# Patient Record
Sex: FEMALE | Race: WHITE | NOT HISPANIC OR LATINO | Employment: FULL TIME | URBAN - METROPOLITAN AREA
[De-identification: names, ages, dates, MRNs, and addresses within clinical notes are randomized per-mention and may not be internally consistent; named-entity substitution may affect disease eponyms.]

---

## 2024-08-13 ENCOUNTER — OFFICE VISIT (OUTPATIENT)
Dept: PODIATRY | Facility: CLINIC | Age: 54
End: 2024-08-13
Payer: COMMERCIAL

## 2024-08-13 VITALS
WEIGHT: 204.81 LBS | DIASTOLIC BLOOD PRESSURE: 75 MMHG | HEART RATE: 84 BPM | BODY MASS INDEX: 32.92 KG/M2 | SYSTOLIC BLOOD PRESSURE: 123 MMHG | HEIGHT: 66 IN

## 2024-08-13 DIAGNOSIS — L97.511 ULCER OF BOTH FEET, LIMITED TO BREAKDOWN OF SKIN: ICD-10-CM

## 2024-08-13 DIAGNOSIS — M20.11 VALGUS DEFORMITY OF BOTH GREAT TOES: ICD-10-CM

## 2024-08-13 DIAGNOSIS — L97.521 ULCER OF BOTH FEET, LIMITED TO BREAKDOWN OF SKIN: ICD-10-CM

## 2024-08-13 DIAGNOSIS — G60.9 IDIOPATHIC PERIPHERAL NEUROPATHY: Primary | ICD-10-CM

## 2024-08-13 DIAGNOSIS — M20.12 VALGUS DEFORMITY OF BOTH GREAT TOES: ICD-10-CM

## 2024-08-13 PROCEDURE — 99999 PR PBB SHADOW E&M-NEW PATIENT-LVL IV: CPT | Mod: PBBFAC,,, | Performed by: PODIATRIST

## 2024-08-13 PROCEDURE — 3008F BODY MASS INDEX DOCD: CPT | Mod: S$GLB,,, | Performed by: PODIATRIST

## 2024-08-13 PROCEDURE — 1159F MED LIST DOCD IN RCRD: CPT | Mod: S$GLB,,, | Performed by: PODIATRIST

## 2024-08-13 PROCEDURE — 3078F DIAST BP <80 MM HG: CPT | Mod: S$GLB,,, | Performed by: PODIATRIST

## 2024-08-13 PROCEDURE — 1160F RVW MEDS BY RX/DR IN RCRD: CPT | Mod: S$GLB,,, | Performed by: PODIATRIST

## 2024-08-13 PROCEDURE — 99203 OFFICE O/P NEW LOW 30 MIN: CPT | Mod: S$GLB,,, | Performed by: PODIATRIST

## 2024-08-13 PROCEDURE — 3074F SYST BP LT 130 MM HG: CPT | Mod: S$GLB,,, | Performed by: PODIATRIST

## 2024-08-13 RX ORDER — MULTIVITAMIN
1 TABLET ORAL DAILY
COMMUNITY

## 2024-08-13 RX ORDER — ERGOCALCIFEROL 1.25 MG/1
50000 CAPSULE ORAL
COMMUNITY

## 2024-08-13 RX ORDER — CITALOPRAM 40 MG/1
40 TABLET, FILM COATED ORAL DAILY
COMMUNITY

## 2024-08-13 RX ORDER — BUSPIRONE HYDROCHLORIDE 10 MG/1
10 TABLET ORAL 2 TIMES DAILY
COMMUNITY

## 2024-08-13 RX ORDER — VALACYCLOVIR HYDROCHLORIDE 500 MG/1
500 TABLET, FILM COATED ORAL 2 TIMES DAILY
COMMUNITY

## 2024-08-13 RX ORDER — ASPIRIN 81 MG/1
81 TABLET ORAL DAILY
COMMUNITY

## 2024-08-17 PROBLEM — L97.511 ULCER OF BOTH FEET, LIMITED TO BREAKDOWN OF SKIN: Status: ACTIVE | Noted: 2024-08-17

## 2024-08-17 PROBLEM — M20.12 VALGUS DEFORMITY OF BOTH GREAT TOES: Status: ACTIVE | Noted: 2024-08-17

## 2024-08-17 PROBLEM — G60.9 IDIOPATHIC PERIPHERAL NEUROPATHY: Status: ACTIVE | Noted: 2024-08-17

## 2024-08-17 PROBLEM — M20.11 VALGUS DEFORMITY OF BOTH GREAT TOES: Status: ACTIVE | Noted: 2024-08-17

## 2024-08-17 PROBLEM — L97.521 ULCER OF BOTH FEET, LIMITED TO BREAKDOWN OF SKIN: Status: ACTIVE | Noted: 2024-08-17

## 2024-08-17 NOTE — PROGRESS NOTES
Spiritual Health Assessment/Progress Note  [facility]    Spiritual/Emotional Needs,  , Adjustment to illness,      Name: Greg Easton MRN: [id number]    Age: 86 y.o.     Sex: male   Language: English   Confucianist: Yarsanism   Shoulder pain     Date: 8/2/2024            Total Time Calculated: 22 min              Spiritual Assessment began in [facility]        Referral/Consult From: Patient, Nurse   Encounter Overview/Reason: Spiritual/Emotional Needs  Service Provided For: Patient and family together    Beatris, Belief, Meaning:   Patient identifies as spiritual, is connected with a beatris tradition or spiritual practice, and has beliefs or practices that help with coping during difficult times  Family/Friends identify as spiritual, are connected with a beatris tradition or spiritual practice, and have beliefs or practices that help with coping during difficult times      Importance and Influence:  Patient has spiritual/personal beliefs that influence decisions regarding their health  Family/Friends have spiritual/personal beliefs that influence decisions regarding the patient's health    Community:  Patient is connected with a spiritual community and feels well-supported. Support system includes: Children  Family/Friends are connected with a spiritual community:    Assessment and Plan of Care:     Patient Interventions include: Facilitated expression of thoughts and feelings, Explored spiritual coping/struggle/distress and theological reflection, and Affirmed coping skills/support systems  Family/Friends Interventions include: Affirmed coping skills/support systems    Patient Plan of Care: Spiritual Care available upon further referral  Family/Friends Plan of Care: Spiritual Care available upon further referral    Electronically signed by ROCHELLE Villeda on 8/2/2024 at 3:03 PM     Subjective:       Patient ID: Tammy Ballard is a 54 y.o. female.    Chief Complaint: Nail Problem and Foot Pain    Patient presents for a new patient evaluation she relates that a few months ago she had pus coming out of the bottom of her nail on her left great toe she states she starting to have some problems with the nail on the right great toe and she states her feel feet feel as if she is walking on rocks.  Patient does have a history of neuropathy secondary to cancer treatment she reports tingling in both feet.  Patient has bunions on both feet she states her feet have begun to bother her for the past 6-9 months.  Past Medical History:   Diagnosis Date    Anxiety     Depression     Hyperlipidemia     Hypertension      Past Surgical History:   Procedure Laterality Date     SECTION      gastric sleeve      HEMICOLECTOMY, RIGHT, LAPAROSCOPIC      HYSTERECTOMY       Family History   Problem Relation Name Age of Onset    Stroke Maternal Grandmother      Diabetes Maternal Grandmother      Cancer Maternal Grandfather       Social History     Socioeconomic History    Marital status:    Tobacco Use    Smoking status: Former     Current packs/day: 0.00     Types: Cigarettes     Quit date:      Years since quittin.6       Current Outpatient Medications   Medication Sig Dispense Refill    aspirin (ECOTRIN) 81 MG EC tablet Take 81 mg by mouth once daily.      busPIRone (BUSPAR) 10 MG tablet Take 10 mg by mouth 2 (two) times daily.      citalopram (CELEXA) 40 MG tablet Take 40 mg by mouth once daily.      ergocalciferol (VITAMIN D2) 50,000 unit Cap Take 50,000 Units by mouth every 7 days.      METOPROLOL SUCCINATE ORAL Take by mouth.      multivitamin (ONE DAILY MULTIVITAMIN) per tablet Take 1 tablet by mouth once daily.      POTASSIUM ACETATE MISC by Misc.(Non-Drug; Combo Route) route.      rosuvastatin calcium (CRESTOR ORAL) Take by mouth.      semaglutide (WEGOVY SUBQ) Inject into the skin.       "valACYclovir (VALTREX) 500 MG tablet Take 500 mg by mouth 2 (two) times daily.       No current facility-administered medications for this visit.     Review of patient's allergies indicates:  No Known Allergies    Review of Systems   Musculoskeletal:  Positive for arthralgias.   Skin:  Positive for color change.   Neurological:  Positive for numbness.   All other systems reviewed and are negative.      Objective:      Vitals:    08/13/24 1439   BP: 123/75   Pulse: 84   Weight: 92.9 kg (204 lb 12.8 oz)   Height: 5' 6" (1.676 m)     Physical Exam  Vitals and nursing note reviewed.   Constitutional:       Appearance: Normal appearance.   Cardiovascular:      Pulses:           Dorsalis pedis pulses are 1+ on the right side and 1+ on the left side.        Posterior tibial pulses are 1+ on the right side and 1+ on the left side.   Pulmonary:      Effort: Pulmonary effort is normal.   Musculoskeletal:         General: Deformity present.      Right foot: Deformity, bunion and prominent metatarsal heads present.      Left foot: Deformity, bunion and prominent metatarsal heads present.        Feet:    Feet:      Right foot:      Protective Sensation: 4 sites tested.  2 sites sensed.      Skin integrity: Ulcer, callus and dry skin present.      Toenail Condition: Fungal disease present.     Left foot:      Protective Sensation: 4 sites tested.  2 sites sensed.      Skin integrity: Ulcer, callus and dry skin present.      Toenail Condition: Fungal disease present.  Skin:     Capillary Refill: Capillary refill takes 2 to 3 seconds.      Findings: Lesion present.   Neurological:      Mental Status: She is alert.      Sensory: Sensory deficit present.   Psychiatric:         Mood and Affect: Mood normal.         Behavior: Behavior normal.                            Assessment:       1. Idiopathic peripheral neuropathy    2. Ulcer of both feet, limited to breakdown of skin    3. Valgus deformity of both great toes        Plan:     "   Patient presents for a new patient evaluation she relates that a few months ago she had pus coming out of the bottom of her nail on her left great toe she states she starting to have some problems with the nail on the right great toe and she states her feel feet feel as if she is walking on rocks.  Patient does have a history of neuropathy secondary to cancer treatment she reports tingling in both feet.  Patient has bunions on both feet she states her feet have begun to bother her for the past 6-9 months.  A comprehensive new patient evaluation was performed patient has signs of neuropathy in both feet primarily in the forefoot bilateral this is likely related to her previous cancer treatment which is very common.  Patient has excessively dry skin she has pre ulcerative areas with hyperkeratotic tissue on the plantar forefoot underlying the 2nd metatarsal right greater than left I advised the patient when she is walking on these hard dry callused areas a causes discomfort certainly contributes to her discomfort this can certainly be related to her neuropathy but also related to the bunion deformities I explained to the patient how the bunion causes a shift of weight transfer to the lesser metatarsals because the 1st metatarsal is not properly bearing weight upon ambulation.  I have recommended the use of a 40% urea cream to keep the areas smooth down keep it hydrated this will help prevent the buildup of callus tissue and likely address some of the discomfort that she is having.  I did evaluate the patient's toes she does have fungus in her toenails I advised while she has obvious signs of fungal infection in the nail there was no bacterial infection even though she had noticed some pus a few months ago this was primarily from the proximal nail fold which is unusual typically the distal nail fold will separate 1st from the nail bed but the patient does have some separation at the proximal nail fold I have  recommended the application of Vicks vapor rub advising the patient this needs to be applied twice a day every day to help address the fungal infection in the nails and it can take 4-6 months of application twice a day before the patient can expect to see positive results.  Patient was in understanding and agreement with everything discussed information was provided for 40% urea cream and wear can be purchased plan follow-up as needed if the patient is not doing better if these things are not improving she is to contact us for follow-up.  Bunion surgery was not recommended nor was it discussed at this time.This note was created using M*Chance (app) voice recognition software that occasionally misinterpreted phrases or words.

## 2025-03-24 ENCOUNTER — OFFICE VISIT (OUTPATIENT)
Dept: URGENT CARE | Facility: CLINIC | Age: 55
End: 2025-03-24
Payer: COMMERCIAL

## 2025-03-24 VITALS
DIASTOLIC BLOOD PRESSURE: 77 MMHG | HEIGHT: 67 IN | TEMPERATURE: 98 F | SYSTOLIC BLOOD PRESSURE: 150 MMHG | BODY MASS INDEX: 29.03 KG/M2 | RESPIRATION RATE: 17 BRPM | WEIGHT: 185 LBS | HEART RATE: 75 BPM | OXYGEN SATURATION: 99 %

## 2025-03-24 DIAGNOSIS — N39.0 URINARY TRACT INFECTION WITHOUT HEMATURIA, SITE UNSPECIFIED: Primary | ICD-10-CM

## 2025-03-24 DIAGNOSIS — R30.0 DYSURIA: ICD-10-CM

## 2025-03-24 DIAGNOSIS — R30.0 BURNING WITH URINATION: ICD-10-CM

## 2025-03-24 LAB
BILIRUBIN, UA POC OHS: NEGATIVE
BLOOD, UA POC OHS: ABNORMAL
CLARITY, UA POC OHS: ABNORMAL
COLOR, UA POC OHS: ABNORMAL
GLUCOSE, UA POC OHS: NEGATIVE
KETONES, UA POC OHS: NEGATIVE
LEUKOCYTES, UA POC OHS: ABNORMAL
NITRITE, UA POC OHS: NEGATIVE
PH, UA POC OHS: 7
PROTEIN, UA POC OHS: 30
SPECIFIC GRAVITY, UA POC OHS: 1.02
UROBILINOGEN, UA POC OHS: 0.2

## 2025-03-24 PROCEDURE — 99214 OFFICE O/P EST MOD 30 MIN: CPT | Mod: S$GLB,,, | Performed by: NURSE PRACTITIONER

## 2025-03-24 PROCEDURE — 81003 URINALYSIS AUTO W/O SCOPE: CPT | Mod: QW,S$GLB,, | Performed by: NURSE PRACTITIONER

## 2025-03-24 RX ORDER — METOPROLOL TARTRATE 25 MG/1
25 TABLET, FILM COATED ORAL 2 TIMES DAILY
COMMUNITY

## 2025-03-24 RX ORDER — MULTIVITAMIN
1 TABLET ORAL EVERY MORNING
COMMUNITY

## 2025-03-24 RX ORDER — PHENAZOPYRIDINE HYDROCHLORIDE 100 MG/1
100 TABLET, FILM COATED ORAL EVERY 8 HOURS PRN
Qty: 6 TABLET | Refills: 0 | Status: SHIPPED | OUTPATIENT
Start: 2025-03-24 | End: 2025-03-26

## 2025-03-24 RX ORDER — IXEKIZUMAB 80 MG/ML
INJECTION, SOLUTION SUBCUTANEOUS
COMMUNITY
Start: 2025-03-18

## 2025-03-24 RX ORDER — CETIRIZINE HYDROCHLORIDE 10 MG/1
10 TABLET, FILM COATED ORAL
COMMUNITY

## 2025-03-24 RX ORDER — FLUTICASONE PROPIONATE 50 MCG
1 SPRAY, SUSPENSION (ML) NASAL
COMMUNITY
Start: 2025-02-28

## 2025-03-24 RX ORDER — ALPRAZOLAM 0.5 MG/1
0.5 TABLET ORAL
COMMUNITY
Start: 2024-03-13

## 2025-03-24 RX ORDER — CYCLOSPORINE 0.5 MG/ML
1 EMULSION OPHTHALMIC 2 TIMES DAILY
COMMUNITY
Start: 2025-02-28

## 2025-03-24 RX ORDER — NITROFURANTOIN 25; 75 MG/1; MG/1
100 CAPSULE ORAL 2 TIMES DAILY
Qty: 10 CAPSULE | Refills: 0 | Status: SHIPPED | OUTPATIENT
Start: 2025-03-24 | End: 2025-03-29

## 2025-03-24 RX ORDER — ESTRADIOL 0.1 MG/G
CREAM VAGINAL
COMMUNITY
Start: 2025-02-28

## 2025-03-24 NOTE — PROGRESS NOTES
"Subjective:      Patient ID: Tammy Ballard is a 54 y.o. female.    Vitals:  height is 5' 7" (1.702 m) and weight is 83.9 kg (185 lb). Her oral temperature is 98.2 °F (36.8 °C). Her blood pressure is 150/77 (abnormal) and her pulse is 75. Her respiration is 17 and oxygen saturation is 99%.     Chief Complaint: Dysuria (Symptoms started on the followin day ago. Symptoms are the following:burning urination, frequency, urgency, slight blood when wipe, odor urine, denies discharge or fever . Symptoms not treated )    54-year-old afebrile female who presents today with chief complaint of dysuria, urinary frequency, and urinary urgency since yesterday.  She denies any fever, chills, nausea, vomiting, abdominal or flank pain.      Dysuria   This is a new problem. The current episode started acute onset. The problem occurs every urination. The problem has been gradually worsening. The quality of the pain is described as aching and burning. The pain is at a severity of 2/10. The pain is mild. There has been no fever. Associated symptoms include frequency, hematuria and urgency. Associated symptoms comments: Odor to urine. She has tried nothing for the symptoms. The treatment provided no relief. Her past medical history is significant for hypertension and recurrent UTIs.       Genitourinary:  Positive for dysuria, frequency, urgency and hematuria.      Objective:     Physical Exam   Constitutional: She is oriented to person, place, and time.  Non-toxic appearance. She does not appear ill. No distress. obesity  HENT:   Head: Normocephalic and atraumatic.   Nose: Nose normal.   Mouth/Throat: Mucous membranes are moist. No posterior oropharyngeal erythema. Oropharynx is clear.   Eyes: Conjunctivae are normal. Extraocular movement intact   Neck: Neck supple. No neck rigidity present.   Cardiovascular: Normal rate, regular rhythm, normal heart sounds and normal pulses.   Pulmonary/Chest: Effort normal and breath sounds " normal.   Abdominal: Normal appearance and bowel sounds are normal. She exhibits no distension and no mass. Soft. flat abdomen There is no abdominal tenderness. There is no left CVA tenderness and no right CVA tenderness.   Musculoskeletal: Normal range of motion.         General: Normal range of motion.      Cervical back: She exhibits no tenderness.   Lymphadenopathy:     She has no cervical adenopathy.   Neurological: She is alert and oriented to person, place, and time.   Skin: Skin is warm, dry and not diaphoretic.   Psychiatric: Her behavior is normal.   Vitals reviewed.      Assessment:     1. Urinary tract infection without hematuria, site unspecified    2. Dysuria    3. Burning with urination        Plan:       Urinary tract infection without hematuria, site unspecified  -     nitrofurantoin, macrocrystal-monohydrate, (MACROBID) 100 MG capsule; Take 1 capsule (100 mg total) by mouth 2 (two) times daily. for 5 days  Dispense: 10 capsule; Refill: 0  -     phenazopyridine (PYRIDIUM) 100 MG tablet; Take 1 tablet (100 mg total) by mouth every 8 (eight) hours as needed for Pain.  Dispense: 6 tablet; Refill: 0    Dysuria  -     POCT Urinalysis(Instrument)    Burning with urination      INSTRUCTIONS  Medications as prescribed.  Rest.  Increase oral fluids.  Follow up with your doctor as advised.  In the meantime, return here or go to ER for worsening of symptoms, or for any new symptoms as discussed.

## 2025-07-14 ENCOUNTER — OFFICE VISIT (OUTPATIENT)
Dept: URGENT CARE | Facility: CLINIC | Age: 55
End: 2025-07-14
Payer: COMMERCIAL

## 2025-07-14 VITALS
RESPIRATION RATE: 19 BRPM | DIASTOLIC BLOOD PRESSURE: 74 MMHG | HEIGHT: 67 IN | WEIGHT: 195 LBS | OXYGEN SATURATION: 99 % | BODY MASS INDEX: 30.61 KG/M2 | SYSTOLIC BLOOD PRESSURE: 112 MMHG | HEART RATE: 80 BPM | TEMPERATURE: 98 F

## 2025-07-14 DIAGNOSIS — N39.0 URINARY TRACT INFECTION WITHOUT HEMATURIA, SITE UNSPECIFIED: ICD-10-CM

## 2025-07-14 DIAGNOSIS — R30.0 DYSURIA: Primary | ICD-10-CM

## 2025-07-14 LAB
BILIRUBIN, UA POC OHS: NEGATIVE
BLOOD, UA POC OHS: ABNORMAL
CLARITY, UA POC OHS: ABNORMAL
COLOR, UA POC OHS: YELLOW
GLUCOSE, UA POC OHS: NEGATIVE
KETONES, UA POC OHS: NEGATIVE
LEUKOCYTES, UA POC OHS: ABNORMAL
NITRITE, UA POC OHS: NEGATIVE
PH, UA POC OHS: 6.5
PROTEIN, UA POC OHS: 30
SPECIFIC GRAVITY, UA POC OHS: >=1.03
UROBILINOGEN, UA POC OHS: 0.2

## 2025-07-14 PROCEDURE — 81003 URINALYSIS AUTO W/O SCOPE: CPT | Mod: QW,S$GLB,, | Performed by: NURSE PRACTITIONER

## 2025-07-14 PROCEDURE — 99214 OFFICE O/P EST MOD 30 MIN: CPT | Mod: S$GLB,,, | Performed by: NURSE PRACTITIONER

## 2025-07-14 RX ORDER — NITROFURANTOIN 25; 75 MG/1; MG/1
100 CAPSULE ORAL 2 TIMES DAILY
Qty: 10 CAPSULE | Refills: 0 | Status: SHIPPED | OUTPATIENT
Start: 2025-07-14 | End: 2025-07-19

## 2025-07-14 RX ORDER — METOPROLOL SUCCINATE 50 MG/1
50 TABLET, EXTENDED RELEASE ORAL
COMMUNITY
Start: 2025-05-01

## 2025-07-14 RX ORDER — ROSUVASTATIN CALCIUM 20 MG/1
20 TABLET, COATED ORAL NIGHTLY
COMMUNITY
Start: 2025-04-29

## 2025-07-14 RX ORDER — TIRZEPATIDE 5 MG/.5ML
INJECTION, SOLUTION SUBCUTANEOUS
COMMUNITY
Start: 2025-06-10

## 2025-07-14 RX ORDER — PHENAZOPYRIDINE HYDROCHLORIDE 100 MG/1
100 TABLET, FILM COATED ORAL EVERY 8 HOURS PRN
Qty: 6 TABLET | Refills: 0 | Status: SHIPPED | OUTPATIENT
Start: 2025-07-14 | End: 2025-07-16

## 2025-07-14 RX ORDER — VALACYCLOVIR HYDROCHLORIDE 1 G/1
TABLET, FILM COATED ORAL
COMMUNITY
Start: 2025-05-01

## 2025-07-14 RX ORDER — OMEPRAZOLE 40 MG/1
40 CAPSULE, DELAYED RELEASE ORAL
COMMUNITY
Start: 2025-05-01

## 2025-07-14 NOTE — PROGRESS NOTES
"Subjective:      Patient ID: Tammy Ballard is a 55 y.o. female.    Vitals:  height is 5' 7" (1.702 m) and weight is 88.5 kg (195 lb). Her oral temperature is 98.2 °F (36.8 °C). Her blood pressure is 112/74 and her pulse is 80. Her respiration is 19 and oxygen saturation is 99%.     Chief Complaint: Dysuria    55 y.o. afebrile female who presents today with a chief complaint of dysuria and pelvic pressure since noon today. Patient reports recurrent UTIs after sexual intercourse. Home treatments include Tylenol.     Dysuria   This is a recurrent problem. The current episode started today. The problem occurs every urination. The problem has been gradually worsening. The quality of the pain is described as burning. The pain is at a severity of 8/10. The pain is severe. There has been no fever. She is Sexually active. There is No history of pyelonephritis. She has tried acetaminophen for the symptoms. The treatment provided mild relief. Her past medical history is significant for hypertension and recurrent UTIs.       Genitourinary:  Positive for dysuria.   Skin:  Negative for erythema.      Objective:     Physical Exam   Constitutional: She is oriented to person, place, and time.  Non-toxic appearance. She does not appear ill. No distress. normal  HENT:   Head: Normocephalic and atraumatic.   Mouth/Throat: Mucous membranes are moist. Oropharynx is clear.   Eyes: Conjunctivae are normal. Extraocular movement intact   Neck: Neck supple. No neck rigidity present.   Cardiovascular: Normal rate, regular rhythm, normal heart sounds and normal pulses.   Pulmonary/Chest: Effort normal and breath sounds normal.   Abdominal: Normal appearance and bowel sounds are normal. She exhibits no distension and no mass. Soft. flat abdomen There is no abdominal tenderness. There is no left CVA tenderness and no right CVA tenderness.   Musculoskeletal: Normal range of motion.         General: Normal range of motion.      Cervical back: She " exhibits no tenderness.   Lymphadenopathy:     She has no cervical adenopathy.   Neurological: She is alert and oriented to person, place, and time.   Skin: Skin is warm, dry, not diaphoretic, not pale and no rash. No bruising, No erythema and No lesion no jaundice  Psychiatric: Her behavior is normal.   Vitals reviewed.    Results for orders placed or performed in visit on 07/14/25   POCT Urinalysis(Instrument)    Collection Time: 07/14/25  4:18 PM   Result Value Ref Range    Color, POC UA Yellow Yellow, Straw, Colorless    Clarity, POC UA Slight Cloudy (A) Clear    Glucose, POC UA Negative Negative    Bilirubin, POC UA Negative Negative    Ketones, POC UA Negative Negative    Spec Grav POC UA >=1.030 1.005 - 1.030    Blood, POC UA Large (A) Negative    pH, POC UA 6.5 5.0 - 8.0    Protein, POC UA 30 (A) Negative    Urobilinogen, POC UA 0.2 <=1.0    Nitrite, POC UA Negative Negative    WBC, POC UA Small (A) Negative    No results found.     Assessment:     1. Dysuria    2. Urinary tract infection without hematuria, site unspecified        Plan:       Dysuria  -     POCT Urinalysis(Instrument)    Urinary tract infection without hematuria, site unspecified  -     nitrofurantoin, macrocrystal-monohydrate, (MACROBID) 100 MG capsule; Take 1 capsule (100 mg total) by mouth 2 (two) times daily. for 5 days  Dispense: 10 capsule; Refill: 0  -     phenazopyridine (PYRIDIUM) 100 MG tablet; Take 1 tablet (100 mg total) by mouth every 8 (eight) hours as needed for Pain.  Dispense: 6 tablet; Refill: 0      INSTRUCTIONS  Medication as prescribed.  Rest.  Increase oral fluids.  Follow up with primary care as advised.  In the meantime, return here or go to ER for worsening of symptoms, or for any new symptoms as discussed.

## 2025-07-24 ENCOUNTER — RESULTS FOLLOW-UP (OUTPATIENT)
Dept: OBSTETRICS AND GYNECOLOGY | Facility: CLINIC | Age: 55
End: 2025-07-24
Payer: COMMERCIAL

## 2025-07-24 ENCOUNTER — LAB VISIT (OUTPATIENT)
Dept: LAB | Facility: HOSPITAL | Age: 55
End: 2025-07-24
Payer: COMMERCIAL

## 2025-07-24 ENCOUNTER — OFFICE VISIT (OUTPATIENT)
Dept: OBSTETRICS AND GYNECOLOGY | Facility: CLINIC | Age: 55
End: 2025-07-24
Payer: COMMERCIAL

## 2025-07-24 DIAGNOSIS — F32.A DEPRESSION, UNSPECIFIED DEPRESSION TYPE: ICD-10-CM

## 2025-07-24 DIAGNOSIS — N95.9 MENOPAUSAL AND PERIMENOPAUSAL DISORDER: Primary | ICD-10-CM

## 2025-07-24 DIAGNOSIS — R30.0 DYSURIA: ICD-10-CM

## 2025-07-24 DIAGNOSIS — N39.0 RECURRENT UTI (URINARY TRACT INFECTION): ICD-10-CM

## 2025-07-24 DIAGNOSIS — R39.15 URGENCY OF URINATION: ICD-10-CM

## 2025-07-24 DIAGNOSIS — F41.9 ANXIETY: ICD-10-CM

## 2025-07-24 DIAGNOSIS — N76.0 ACUTE VAGINITIS: ICD-10-CM

## 2025-07-24 DIAGNOSIS — R53.83 FATIGUE, UNSPECIFIED TYPE: ICD-10-CM

## 2025-07-24 DIAGNOSIS — N95.9 MENOPAUSAL AND PERIMENOPAUSAL DISORDER: ICD-10-CM

## 2025-07-24 LAB
BILIRUB UR QL STRIP.AUTO: NEGATIVE
CLARITY UR: CLEAR
COLOR UR AUTO: YELLOW
ESTRADIOL SERPL HS-MCNC: 13 PG/ML
FSH SERPL-ACNC: 79.24 MIU/ML
GLUCOSE UR QL STRIP: NEGATIVE
HGB UR QL STRIP: ABNORMAL
KETONES UR QL STRIP: NEGATIVE
LEUKOCYTE ESTERASE UR QL STRIP: NEGATIVE
NITRITE UR QL STRIP: NEGATIVE
PH UR STRIP: 7 [PH]
PROT UR QL STRIP: NEGATIVE
SP GR UR STRIP: 1.01
TESTOST SERPL-MCNC: 18 NG/DL (ref 5–73)
TSH SERPL-ACNC: 0.53 UIU/ML (ref 0.4–4)
UROBILINOGEN UR STRIP-ACNC: NEGATIVE EU/DL

## 2025-07-24 PROCEDURE — 81515 NFCT DS BV&VAGINITIS DNA ALG: CPT

## 2025-07-24 PROCEDURE — 83001 ASSAY OF GONADOTROPIN (FSH): CPT

## 2025-07-24 PROCEDURE — 3078F DIAST BP <80 MM HG: CPT | Mod: S$GLB,,,

## 2025-07-24 PROCEDURE — 3074F SYST BP LT 130 MM HG: CPT | Mod: S$GLB,,,

## 2025-07-24 PROCEDURE — 36415 COLL VENOUS BLD VENIPUNCTURE: CPT

## 2025-07-24 PROCEDURE — 84403 ASSAY OF TOTAL TESTOSTERONE: CPT

## 2025-07-24 PROCEDURE — 82670 ASSAY OF TOTAL ESTRADIOL: CPT

## 2025-07-24 PROCEDURE — 87086 URINE CULTURE/COLONY COUNT: CPT

## 2025-07-24 PROCEDURE — 84443 ASSAY THYROID STIM HORMONE: CPT

## 2025-07-24 PROCEDURE — 1160F RVW MEDS BY RX/DR IN RCRD: CPT | Mod: S$GLB,,,

## 2025-07-24 PROCEDURE — 81003 URINALYSIS AUTO W/O SCOPE: CPT

## 2025-07-24 PROCEDURE — 99204 OFFICE O/P NEW MOD 45 MIN: CPT | Mod: S$GLB,,,

## 2025-07-24 PROCEDURE — 99999 PR PBB SHADOW E&M-EST. PATIENT-LVL IV: CPT | Mod: PBBFAC,,,

## 2025-07-24 PROCEDURE — 3008F BODY MASS INDEX DOCD: CPT | Mod: S$GLB,,,

## 2025-07-24 PROCEDURE — 1159F MED LIST DOCD IN RCRD: CPT | Mod: S$GLB,,,

## 2025-07-24 NOTE — PROGRESS NOTES
"Gynecology Visit  History & Physical      SUBJECTIVE:     History of Present Illness:  Patient is a 55 y.o. female presents with complaints of decrease in efficacy of Celexa used to treat depression.  Patient reports for approximately 30 years has been taking 40 mg of Celexa daily, recently began to notice decrease in symptom relief.  Patient reports discuss this finding with primary care who recommended Prozac 10 mg daily.  Patient also reports history of recurrent UTI.  Patient states has been treated 4 times since January.  Went to urgent Care last Tuesday for symptoms of urgency, dysuria, and urinary odor was prescribed Macrobid and Pyridium.  Patient feels as if she is still having symptoms of UTI today.  Patient desires to have hormone labs performed today.  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    Chief Complaint   Patient presents with    Urinary Tract Infection           hormonal complaints    Depression       Review of patient's allergies indicates:  No Known Allergies    Current Medications[1]  OB History          3    Para   2    Term   2            AB   1    Living             SAB   1    IAB        Ectopic        Multiple        Live Births                 No LMP recorded. Patient has had a hysterectomy.      Past Medical History:   Diagnosis Date    Anxiety     Colon cancer     Depression     Hyperlipidemia     Hypertension      Past Surgical History:   Procedure Laterality Date     SECTION      gastric sleeve      HEMICOLECTOMY, RIGHT, LAPAROSCOPIC      HYSTERECTOMY       Family History   Problem Relation Name Age of Onset    Stroke Maternal Grandmother      Diabetes Maternal Grandmother      Cancer Maternal Grandfather      Uterine cancer Neg Hx      Colon cancer Neg Hx      Ovarian cancer Neg Hx      Breast cancer Neg Hx       Social History[2]     OBJECTIVE:     Vital Signs (Most Recent)  Pulse: 74 (25 0954)  BP: 109/67 (25 0954)  5' 6" (1.676 " m)  83.9 kg (185 lb)     Physical Exam:  Physical Exam  Vitals and nursing note reviewed.   Constitutional:       General: She is awake.   Pulmonary:      Effort: Pulmonary effort is normal.   Genitourinary:     General: Normal vulva.      Exam position: Lithotomy position.      Vagina: Normal.   Skin:     General: Skin is warm and dry.   Neurological:      Mental Status: She is alert and oriented to person, place, and time.   Psychiatric:         Attention and Perception: Attention and perception normal.         Mood and Affect: Mood and affect normal.         Speech: Speech normal.         Behavior: Behavior normal. Behavior is cooperative.         Thought Content: Thought content normal.         Cognition and Memory: Cognition normal.         Judgment: Judgment normal.       ASSESSMENT/PLAN:       ICD-10-CM ICD-9-CM   1. Menopausal and perimenopausal disorder  N95.9 627.9   2. Depression, unspecified depression type  F32.A 311   3. Fatigue, unspecified type  R53.83 780.79   4. Anxiety  F41.9 300.00   5. Recurrent UTI (urinary tract infection)  N39.0 599.0   6. Dysuria  R30.0 788.1   7. Urgency of urination  R39.15 788.63   8. Acute vaginitis  N76.0 616.10       PLAN:  Recommended consistent usage of estradiol vaginal cream 2-3 times weekly, discussed mechanism of action and indication of medication  Recommend weaning off of Celexa and starting Prozac 10 mg daily, may increase to 20 mg after 2 weeks.  If still not therapeutic may increase further with guidance  Recommend establishing care with psychiatry for medication management, will place referral  Hormone labs collected today, discussed due to history of DVT and PE after surgery do not recommend HRT due to increased risks  Urinalysis and urine culture collected today, will follow up results and treat appropriately  Vaginitis swab performed today, will follow up results  Last Pap: patient had complete hyst; pap testing no longer indicated   Last mammo:  approximate date 3/2025 and was normal  Follow up in 1 year for annual exam or sooner as needed    In excessive of 45 minutes total time spent for evaluation and management services. Time included elements of the following: time spent preparing to see patient, obtaining and reviewing separately obtained history, exam, evaluation, counseling and education of patient/family member or care giver, documenting in the HMR, independently interpreting results and communication of results, coordination of care ordering medications, tests, or procedures, referral and communication to other health care professionals.     Thank you for allowing me to participate in your care today. It is an honor to be a part of your healthcare team at [facility]. If you have any questions or concerns regarding your visit today, please do not hesitate to contact us.    Latosha Dumont, [facility]  Gynecology    [address] [facility], [place] [id number] [phone]                [1]   Current Outpatient Medications   Medication Sig Dispense Refill    ALLERGY RELIEF, CETIRIZINE, 10 mg tablet Take 10 mg by mouth.      ALPRAZolam (XANAX) 0.5 MG tablet 0.5 mg.      aspirin (ECOTRIN) 81 MG EC tablet Take 81 mg by mouth once daily.      busPIRone (BUSPAR) 10 MG tablet Take 10 mg by mouth 2 (two) times daily.      CALCIUM-MAGNESIUM-ZINC ORAL       citalopram (CELEXA) 40 MG tablet Take 40 mg by mouth once daily.      cycloSPORINE (RESTASIS) 0.05 % ophthalmic emulsion Place 1 drop into both eyes 2 (two) times daily.      docusate sodium (COLACE) 50 MG capsule       ELDERBERRY FRUIT ORAL Take by mouth.      estradioL (ESTRACE) 0.01 % (0.1 mg/gram) vaginal cream SMARTSI.5 Applicator Vaginal Twice a Week      fluticasone propionate (FLONASE) 50 mcg/actuation nasal spray 1 spray by Each Nostril route.      Lactobacillus no.46/B.animalis (PROBIOTIC-10 ORAL)       metoprolol succinate (TOPROL-XL) 50 MG 24 hr tablet Take 50 mg by mouth.      METOPROLOL  SUCCINATE ORAL Take by mouth.      multivitamin (ONE DAILY MULTIVITAMIN) per tablet Take 1 tablet by mouth once daily.      multivitamin with folic acid 400 mcg Tab Take 1 tablet by mouth every morning.      omeprazole (PRILOSEC) 40 MG capsule Take 40 mg by mouth.      POTASSIUM ACETATE MISC by Misc.(Non-Drug; Combo Route) route.      rosuvastatin (CRESTOR) 20 MG tablet Take 20 mg by mouth every evening.      TALTZ AUTOINJECTOR 80 mg/mL AtIn       ubidecarenone (COQ-10 ORAL)       valACYclovir (VALTREX) 1000 MG tablet = 1 tab, Oral, Daily, # 90 tab, 3 Refill(s), Pharmacy: [facility]/pharmacy #[id number], 1 tab Oral Daily, 168, cm, 25 8:25:00 CDT, Height/Length Measured, 86.4, kg, 25 8:25:00 CDT, Weight Dosing      valACYclovir (VALTREX) 500 MG tablet Take 500 mg by mouth 2 (two) times daily.      ZEPBOUND 5 mg/0.5 mL PnIj Inject into the skin.       No current facility-administered medications for this visit.   [2]   Social History  Tobacco Use    Smoking status: Former     Current packs/day: 0.00     Types: Cigarettes     Quit date:      Years since quittin.5     Passive exposure: Never    Smokeless tobacco: Never   Substance Use Topics    Alcohol use: Yes    Drug use: Never

## 2025-07-26 LAB — BACTERIA UR CULT: NO GROWTH

## 2025-07-29 VITALS
DIASTOLIC BLOOD PRESSURE: 67 MMHG | WEIGHT: 185 LBS | HEIGHT: 66 IN | HEART RATE: 74 BPM | BODY MASS INDEX: 29.73 KG/M2 | SYSTOLIC BLOOD PRESSURE: 109 MMHG

## 2025-08-01 LAB
BACTERIAL VAGINOSIS DNA (OHS): NOT DETECTED
CANDIDA GLABRATA/KRUSEI DNA (OHS): NOT DETECTED
CANDIDA SPECIES DNA (OHS): NOT DETECTED
TRICHOMONAS VAGINALIS DNA (OHS): NOT DETECTED

## 2025-08-18 ENCOUNTER — PATIENT MESSAGE (OUTPATIENT)
Dept: OBSTETRICS AND GYNECOLOGY | Facility: CLINIC | Age: 55
End: 2025-08-18
Payer: COMMERCIAL

## 2025-08-18 DIAGNOSIS — F32.A DEPRESSION, UNSPECIFIED DEPRESSION TYPE: Primary | ICD-10-CM

## 2025-08-18 DIAGNOSIS — F41.9 ANXIETY: ICD-10-CM

## 2025-08-18 RX ORDER — FLUOXETINE 20 MG/1
20 CAPSULE ORAL DAILY
Qty: 90 CAPSULE | Refills: 3 | Status: SHIPPED | OUTPATIENT
Start: 2025-08-18 | End: 2026-08-18